# Patient Record
Sex: FEMALE | Race: BLACK OR AFRICAN AMERICAN | NOT HISPANIC OR LATINO | Employment: UNEMPLOYED | URBAN - METROPOLITAN AREA
[De-identification: names, ages, dates, MRNs, and addresses within clinical notes are randomized per-mention and may not be internally consistent; named-entity substitution may affect disease eponyms.]

---

## 2018-01-16 NOTE — RESULT NOTES
Message    Gastric biopsies are benign and negative for H  pylori  Patient to call for office visit if she continues to have symptoms with constipation    pt notified of results  Verified Results  (1) TISSUE EXAM [id number] 01:40PM Rogelio Miller     Test Name Result Flag Reference   LAB AP CASE REPORT (Report)     Surgical Pathology Report             Case: [id number]                   Authorizing Provider: Joseph Davidson MD     Collected:      09/14/2016 1340        Ordering Location:   [facility]    Received:      09/14/2016 [facility]                               Pathologist:      Roberto Ahmadi MD                                Specimen:  Stomach, gastric body   LAB AP FINAL DIAGNOSIS (Report)     A  Stomach, gastric body ; biopsy:  -Benign gastric body type mucosa with minimal superficial chronic inactive   gastritis   -No evidence of Paneth cell metaplasia or atrophic gastritis   -No evidence of dysplasia or malignancy   -No H Pylori organisms identified on immunohistochemical stained slide  Control reacted appropriately  Electronically signed by Roberto Ahmadi MD on 9/19/2016 at 9:26 AM   LAB AP NOTE      Interpretation performed at [address] [place] [address]   LAB [address] [place] [address] (Report)     These tests were developed and their performance characteristics   determined by [facility] or [facility]  They may not be cleared or approved by the [place]  Food and   Drug Administration  The FDA has determined that such clearance or   approval is not necessary  These tests are used for clinical purposes  They should not be regarded as investigational or for research  This   laboratory has been approved by CLIA 88, designated as a high-complexity   laboratory and is qualified to perform these tests  LAB AP GROSS DESCRIPTION (Report)     A   The specimen is received in formalin, labeled with the patient's name   and hospital number, and is designated gastric body biopsy  The   specimen consists of a single white to tan soft tissue fragment measuring   0 9 x 0 2 x 0 2 cm in greatest dimension  Entirely submitted  One   cassette  Note: The estimated total formalin fixation time based upon information   provided by the submitting clinician and the standard processing schedule   is 38 75 hours        ACL   LAB AP CLINICAL INFORMATION eval h pylori     eval h pylori

## 2021-09-16 ENCOUNTER — APPOINTMENT (OUTPATIENT)
Dept: LAB | Facility: CLINIC | Age: 26
End: 2021-09-16
Payer: COMMERCIAL

## 2021-09-16 DIAGNOSIS — E06.3 CHRONIC LYMPHOCYTIC THYROIDITIS: ICD-10-CM

## 2021-09-16 LAB
T3FREE SERPL-MCNC: 2.79 PG/ML (ref 2.3–4.2)
T4 FREE SERPL-MCNC: 0.78 NG/DL (ref 0.76–1.46)
T4 SERPL-MCNC: 7.8 UG/DL (ref 4.7–13.3)
TSH SERPL DL<=0.05 MIU/L-ACNC: 0.56 UIU/ML (ref 0.36–3.74)

## 2021-09-16 PROCEDURE — 86800 THYROGLOBULIN ANTIBODY: CPT

## 2021-09-16 PROCEDURE — 84442 ASSAY OF THYROID ACTIVITY: CPT

## 2021-09-16 PROCEDURE — 86376 MICROSOMAL ANTIBODY EACH: CPT

## 2021-09-16 PROCEDURE — 36415 COLL VENOUS BLD VENIPUNCTURE: CPT

## 2021-09-16 PROCEDURE — 84443 ASSAY THYROID STIM HORMONE: CPT

## 2021-09-16 PROCEDURE — 84481 FREE ASSAY (FT-3): CPT

## 2021-09-16 PROCEDURE — 84439 ASSAY OF FREE THYROXINE: CPT

## 2021-09-16 PROCEDURE — 84479 ASSAY OF THYROID (T3 OR T4): CPT

## 2021-09-17 LAB
T3RU NFR SERPL: 25 % (ref 24–39)
THYROGLOB AB SERPL-ACNC: <1 IU/ML (ref 0–0.9)
THYROPEROXIDASE AB SERPL-ACNC: <8 IU/ML (ref 0–34)

## 2021-09-18 LAB — T4BG SERPL-MCNC: 27 UG/ML (ref 13–39)
